# Patient Record
Sex: FEMALE | Race: BLACK OR AFRICAN AMERICAN | Employment: OTHER | URBAN - METROPOLITAN AREA
[De-identification: names, ages, dates, MRNs, and addresses within clinical notes are randomized per-mention and may not be internally consistent; named-entity substitution may affect disease eponyms.]

---

## 2019-03-13 ENCOUNTER — OFFICE VISIT (OUTPATIENT)
Dept: NEUROLOGY | Age: 64
End: 2019-03-13

## 2019-03-13 VITALS
OXYGEN SATURATION: 98 % | DIASTOLIC BLOOD PRESSURE: 90 MMHG | SYSTOLIC BLOOD PRESSURE: 130 MMHG | WEIGHT: 278 LBS | HEART RATE: 84 BPM | RESPIRATION RATE: 16 BRPM | BODY MASS INDEX: 44.68 KG/M2 | HEIGHT: 66 IN

## 2019-03-13 DIAGNOSIS — R20.2 PARESTHESIA: Primary | ICD-10-CM

## 2019-03-13 PROBLEM — E66.01 OBESITY, MORBID (HCC): Status: ACTIVE | Noted: 2019-03-13

## 2019-03-13 RX ORDER — INSULIN DETEMIR 100 [IU]/ML
INJECTION, SOLUTION SUBCUTANEOUS
COMMUNITY
Start: 2019-02-28

## 2019-03-13 RX ORDER — EMPAGLIFLOZIN 25 MG/1
TABLET, FILM COATED ORAL
COMMUNITY
Start: 2019-02-01

## 2019-03-13 RX ORDER — CLOPIDOGREL BISULFATE 75 MG/1
TABLET ORAL
COMMUNITY
Start: 2019-01-30

## 2019-03-13 RX ORDER — EVOLOCUMAB 140 MG/ML
INJECTION, SOLUTION SUBCUTANEOUS
COMMUNITY
Start: 2019-02-20

## 2019-03-13 RX ORDER — HYDROXYCHLOROQUINE SULFATE 200 MG/1
TABLET, FILM COATED ORAL
COMMUNITY
Start: 2019-02-01

## 2019-03-13 RX ORDER — ROSUVASTATIN CALCIUM 40 MG/1
TABLET, COATED ORAL
COMMUNITY
Start: 2019-03-06

## 2019-03-13 RX ORDER — QUINAPRIL 40 MG/1
TABLET ORAL
COMMUNITY
Start: 2019-01-30

## 2019-03-13 RX ORDER — EZETIMIBE 10 MG/1
TABLET ORAL
COMMUNITY
Start: 2019-02-01

## 2019-03-13 RX ORDER — GABAPENTIN 300 MG/1
CAPSULE ORAL
COMMUNITY
Start: 2019-02-28

## 2019-03-13 RX ORDER — DOXAZOSIN 2 MG/1
TABLET ORAL
COMMUNITY
Start: 2019-02-01

## 2019-03-13 RX ORDER — DULOXETIN HYDROCHLORIDE 30 MG/1
CAPSULE, DELAYED RELEASE ORAL
COMMUNITY
Start: 2019-02-01

## 2019-03-13 RX ORDER — AMLODIPINE BESYLATE 10 MG/1
TABLET ORAL DAILY
COMMUNITY

## 2019-03-13 RX ORDER — CARVEDILOL 6.25 MG/1
TABLET ORAL
COMMUNITY
Start: 2019-02-01

## 2019-03-13 RX ORDER — HYDROCODONE BITARTRATE AND ACETAMINOPHEN 5; 325 MG/1; MG/1
TABLET ORAL
COMMUNITY
Start: 2019-02-03

## 2019-03-13 RX ORDER — LATANOPROST 50 UG/ML
SOLUTION/ DROPS OPHTHALMIC
COMMUNITY
Start: 2019-02-04

## 2019-03-13 NOTE — PROGRESS NOTES
NEUROLOGY NEW PATIENT OFFICE CONSULTATION      3/13/2019    RE: Dennis Brown         1955      REFERRED BY:  Esperanza Heck MD        CHIEF COMPLAINT:  This is Dennis Brown is a 66 y.o. female right handed on disability for the Lupus who had concerns including New Patient. HPI:     2 months ago, patient noted numbness of all fingers of both hands, left worse than right, comes and goes, wakes patient up at night, relieved with shaking hands     (+) L wrist pain   (+) weakness of   (+) neck pain radiating to shoulders  (+) numbness of both feet for 1 yr    ROS   (-) fever  (-) rash  All other systems reviewed and are negative    Past Medical Hx  Past Medical History:   Diagnosis Date    Arthritis     Diabetes ([facility] [address]     Headache     Hypertension     Stroke ([facility] [address]    Lupus since 2002 - seeing Dr Rosanna Crespo (rheumatologist)  Fibromyalgia  Stroke 2002 with residual weakness of the R arm and R leg    Social Hx  Social History     Socioeconomic History    Marital status: UNKNOWN     Spouse name: Not on file    Number of children: Not on file    Years of education: Not on file    Highest education level: Not on file   Tobacco Use    Smoking status: Never Smoker    Smokeless tobacco: Never Used   Substance and Sexual Activity    Alcohol use: No     Frequency: Never       Family Hx  No family history on file.     ALLERGIES  Allergies   Allergen Reactions    Metformin Diarrhea    Zocor [Simvastatin] Rash       CURRENT MEDS  Current Outpatient Medications   Medication Sig Dispense Refill    JARDIANCE 25 mg tablet       DULoxetine (CYMBALTA) 30 mg capsule       clopidogrel (PLAVIX) 75 mg tab       HYDROcodone-acetaminophen (NORCO) 5-325 mg per tablet       gabapentin (NEURONTIN) 300 mg capsule       latanoprost (XALATAN) 0.005 % ophthalmic solution       rosuvastatin (CRESTOR) 40 mg tablet       LEVEMIR FLEXTOUCH U-100 INSULN 100 unit/mL (3 mL) inpn       REPATHA SURECLICK pen injection       ezetimibe (ZETIA) 10 mg tablet       quinapril (ACCUPRIL) 40 mg tablet       carvedilol (COREG) 6.25 mg tablet       doxazosin (CARDURA) 2 mg tablet       amLODIPine (NORVASC) 10 mg tablet Take  by mouth daily.  insulin lispro (HUMALOG KWIKPEN INSULIN) 200 unit/mL (3 mL) inpn by SubCUTAneous route.  hydroxychloroquine (PLAQUENIL) 200 mg tablet              PREVIOUS WORKUP: (reviewed)  IMAGING:    CT Results (recent):  No results found for this or any previous visit. MRI Results (recent):  No results found for this or any previous visit. IR Results (recent):  No results found for this or any previous visit. VAS/US Results (recent):  No results found for this or any previous visit. LABS (reviewed)  No results found for this or any previous visit. Physical Exam:     Visit Vitals  /90 (BP 1 Location: Right arm, BP Patient Position: Sitting)   Pulse 84   Resp 16   Ht 5' 6\" (1.676 m)   Wt 126.1 kg (278 lb)   SpO2 98%   BMI 44.87 kg/m²     General:  Alert, cooperative, no distress. Obese   Head:  Normocephalic, without obvious abnormality, atraumatic. Eyes:  Conjunctivae/corneas clear. Lungs:  Heart:   Non labored breathing  Regular rate and rhythm, no carotid bruits   Abdomen:   Soft, non-distended   Extremities: Extremities normal, atraumatic, no cyanosis or edema. Pulses: 2+ and symmetric all extremities. Skin: Skin color, texture, turgor normal. No rashes or lesions.   Neurologic Exam     Gen: Attention normal             Language: naming, repetition, fluency normal             Memory: intact recent and remote memory  Cranial Nerves:  I: smell Not tested   II: visual fields Full to confrontation   II: pupils Equal, round, reactive to light   II: optic disc No papilledema   III,VII: ptosis none   III,IV,VI: extraocular muscles  Full ROM   V: mastication normal   V: facial light touch sensation  normal   VII: facial muscle function   Minimal R facial asymmetry   VIII: hearing symmetric   IX: soft palate elevation  normal   XI: trapezius strength  5/5   XI: sternocleidomastoid strength 5/5   XI: neck flexion strength  5/5   XII: tongue  midline     Motor: (+) R pronator drift  (+) slow R foot tapping  Sensory:  dec PP R arm and R leg  and L 5th finger  Reflexes: 1+ L UE 2+ R UE, + knees and ankles throughout; Equivocal toes  Coordination: Good FTN and HTS  Gait: mild spastic R gait           Impression:     Pipo Fortune is a 66 y.o. female who  has a past medical history of Arthritis, Diabetes ([address] Headache, Hypertension, and Stroke ([facility] [address] Lupus, fibromyalgia seeing Dr Jackson Comer (rheumatology) who 2 months ago, noted numbness of all fingers of both hands, left worse than right, comes and goes, wakes patient up at night, relieved with shaking hands associated with L wrist pain and weakness of . Considerations include carpal tunnel syndrome, cervical radiculopathy ( neck pain radiating to shoulders) and part of generalized polyneuropathy due to diabetes ( numbness of both feet for 1 yr). On exam, patient also has mild R hemiplegia and hemisensory loss more likely residual from previous stroke event in 2002. RECOMMENDATIONS  1. I had a long discussion with patient. Discussed diagnosis, prognosis, pathophysiology and available treatment. All questions were answered. 2. Will do EMG/NCS of both UE with CTS protocol  3. Advise to optimize medical management of DM  4. Already seeing Dr Jackson Comer fo rher lupus and fibromyalgia  5. Depending on above, will consider surgical referral, hand exercises and/or wrist splint      Follow-up Disposition:  Return for above testing.       Thank you for the consultation      Marlee Chandra MD  Diplomate, American Board of Psychiatry and Neurology  Diplomate, Neuromuscular Medicine  Diplomate, American Board of Electrodiagnostic Medicine        CC: Se Aburto MD  Fax: [phone]

## 2019-03-13 NOTE — LETTER
3/13/2019 1:41 PM 
 
Patient:  Anat Dawn YOB: 1955 Date of Visit: 3/13/2019 Dear Channing Candelaria MD 
[address] [place] [address] VIA Facsimile: [phone] Naren Leo MD 
[facility] 
[address] [facility] [address] VIA Facsimile: [phone] 
 : Thank you for referring Ms. Anat Dawn to me for evaluation/treatment. Below are the relevant portions of my assessment and plan of care. If you have questions, please do not hesitate to call me. I look forward to following Ms. Pedersen along with you. Sincerely, Dorothy Oquendo MD

## 2019-03-28 ENCOUNTER — OFFICE VISIT (OUTPATIENT)
Dept: NEUROLOGY | Age: 64
End: 2019-03-28

## 2019-03-28 DIAGNOSIS — G56.03 BILATERAL CARPAL TUNNEL SYNDROME: Primary | ICD-10-CM

## 2019-03-28 DIAGNOSIS — R20.2 PARESTHESIA: ICD-10-CM

## 2019-03-28 NOTE — PATIENT INSTRUCTIONS
Carpal Tunnel Syndrome: Exercises  Your Care Instructions  Here are some examples of typical rehabilitation exercises for your condition. Start each exercise slowly. Ease off the exercise if you start to have pain. Your doctor or your physical or occupational therapist will tell you when you can start these exercises and which ones will work best for you. Warm-up stretches  When you no longer have pain or numbness, you can do exercises to help prevent carpal tunnel syndrome from coming back. Do not do any stretch or movement that is uncomfortable or painful. 1. Rotate your wrist up, down, and from side to side. Repeat 4 times. 2. Stretch your fingers far apart. Relax them, and then stretch them again. Repeat 4 times. 3. Stretch your thumb by pulling it back gently, holding it, and then releasing it. Repeat 4 times. How to do the exercises  Prayer stretch    1. Start with your palms together in front of your chest just below your chin. 2. Slowly lower your hands toward your waistline, keeping your hands close to your stomach and your palms together until you feel a mild to moderate stretch under your forearms. 3. Hold for at least 15 to 30 seconds. Repeat 2 to 4 times. Wrist flexor stretch    1. Extend your arm in front of you with your palm up. 2. Bend your wrist, pointing your hand toward the floor. 3. With your other hand, gently bend your wrist farther until you feel a mild to moderate stretch in your forearm. 4. Hold for at least 15 to 30 seconds. Repeat 2 to 4 times. Wrist extensor stretch    1. Repeat steps 1 through 4 of the stretch above, but begin with your extended hand palm down. Follow-up care is a key part of your treatment and safety. Be sure to make and go to all appointments, and call your doctor if you are having problems. It's also a good idea to know your test results and keep a list of the medicines you take. Where can you learn more?   Go to http://jacinto-jaz.info/. Enter E768 in the search box to learn more about \"Carpal Tunnel Syndrome: Exercises. \"  Current as of: September 20, 2018  Content Version: 11.9  © 1716-1252 [employer]. Care instructions adapted under license by [employer] (which disclaims liability or warranty for this information). If you have questions about a medical condition or this instruction, always ask your healthcare professional. [place] [address] any warranty or liability for your use of this information.

## 2019-03-28 NOTE — PROCEDURES
EMG/ NCS Report  DRUG REHABILITATION  - [facility]  [facility]  [facility], [address] [place], [address]   Ph: [phone]   FAX: [phone]/ [phone]  Test Date:  3/28/2019    Patient: Santos Scott : 1955 Physician: Yasemin Schumacher MD   Sex: Male Height: ' \" Ref Phys: Estella Carcamo MD   ID#: [id number] Weight:  lbs. Technician: Stew Jin     Patient History / Exam:  Patient is coming for neuropathy evaluation. Santos Scott is a 66 y.o. female who  has a past medical history of Arthritis, Diabetes ([facility] [address] Headache, Hypertension, and Stroke ([facility] [address] Lupus, fibromyalgia seeing Dr Murray Langley (rheumatology) who 2 months ago, noted numbness of all fingers of both hands, left worse than right, comes and goes, wakes patient up at night, relieved with shaking hands associated with L wrist pain and weakness of . Considerations include carpal tunnel syndrome, cervical radiculopathy ( neck pain radiating to shoulders) and part of generalized polyneuropathy due to diabetes ( numbness of both feet for 1 yr). On exam, patient also has mild R hemiplegia and hemisensory loss more likely residual from previous stroke event in . Exam: Patient awake, alert, follows commands, clear speech; hearing grossly intact; EOMI, (-) facial asymmetry, tongue midline; Motor: (+) R pronator drift  (+) slow R foot tapping  Sensory:  dec PP R arm and R leg  and L 5th finger  Reflexes: 1+ L UE 2+ R UE, + knees and ankles throughout; Equivocal toes  Coordination: Good FTN and HTS  Gait: mild spastic R gait    EMG & NCV Findings:  Evaluation of the left median motor and the right median motor nerves showed normal distal onset latency (L4.5, R4.5 ms), normal amplitude (L7.7, R8.6 mV), and normal conduction velocity (Elbow-Wrist, L50, R52 m/s).   The left ulnar motor and the right ulnar motor nerves showed normal distal onset latency (L3.0, R3.0 ms), normal amplitude (L7.8, R7.1 mV), normal conduction velocity (B Elbow-Wrist, L57, R51 m/s), and normal conduction velocity (A Elbow-B Elbow, L59, R50 m/s). The left median sensory and the right median sensory nerves showed prolonged distal peak latency (L5.1, R5.3 ms) and normal amplitude (L45.2, R27.7 µV). The left radial sensory, the right radial sensory, the left ulnar sensory, and the right ulnar sensory nerves showed normal distal peak latency (L2.1, R2.0, L3.5, R3.3 ms) and normal amplitude (L56.7, R47.1, L26.5, R21.5 µV). All left vs. right side differences were within normal limits. All F Wave latencies were within normal limits. All F Wave left vs. right side latency differences were within normal limits. All examined muscles (as indicated in the following table) showed no evidence of electrical instability. Impression:  ABNORMAL    Extensive electrodiagnostic examination of the right and left upper extremities shows evidence of bilateral median neuropathies at or distal to the wrist, consistent with a clinical diagnosis of carpal tunnel syndrome, both mild in degree electrically. There is no evidence of a cervical motor radiculopathy.         Juan A Pulliam MD  Diplomate, American Board of Psychiatry and Neurology  Diplomate, Neuromuscular Medicine  Diplomate, American Board of Electrodiagnostic Medicine  Director, [address] [place] [address] Accredited Laboratory with Exemplary Status      Nerve Conduction Studies  Anti Sensory Summary Table     Stim Site NR Peak (ms) Norm Peak (ms) P-T Amp (µV) Norm P-T Amp Site1 Site2 Dist (cm)   Left Median Anti Sensory (2nd Digit)  29.9°C   Wrist    5.1 <4 45.2 >13 Wrist 2nd Digit 14.0   Right Median Anti Sensory (2nd Digit)  30.3°C   Wrist    5.3 <4 27.7 >13 Wrist 2nd Digit 14.0   Left Radial Anti Sensory (Base 1st Digit)  29.1°C   Wrist    2.1 <2.8 56.7 >11 Wrist Base 1st Digit 10.0   Right Radial Anti Sensory (Base 1st Digit)  29.6°C   Wrist    2.0 <2.8 47.1 >11 Wrist Base 1st Digit 10.0   Left Ulnar Anti Sensory (5th Digit)  30.2°C   Wrist    3.5 <4.0 26.5 >9 Wrist 5th Digit 14.0   Right Ulnar Anti Sensory (5th Digit)  29.5°C   Wrist    3.3 <4.0 21.5 >9 Wrist 5th Digit 14.0     Motor Summary Table     Stim Site NR Onset (ms) Norm Onset (ms) O-P Amp (mV) Norm O-P Amp Amp (Prev) (%) Site1 Site2 Dist (cm) Jatin (m/s) Norm Jatin (m/s)   Left Median Motor (Abd Poll Brev)  28.9°C   Wrist    4.5 <4.5 7.7 >4.1 100.0 Wrist Abd Poll Brev 8.0     Elbow    8.9  7.3  94.8 Elbow Wrist 22.0 50 >49   Right Median Motor (Abd Poll Brev)  29.6°C   Wrist    4.5 <4.5 8.6 >4.1 100.0 Wrist Abd Poll Brev 8.0     Elbow    8.8  8.2  95.3 Elbow Wrist 22.5 52 >49   Left Ulnar Motor (Abd Dig Minimi)  27.1°C   Wrist    3.0 <3.1 7.8 >7.0 100.0 Wrist Abd Dig Minimi 8.0  >50   B Elbow    6.7  7.6  97.4 B Elbow Wrist 21.0 57 >50   A Elbow    8.4  7.4  97.4 A Elbow B Elbow 10.0 59 >50   Right Ulnar Motor (Abd Dig Minimi)  28.6°C   Wrist    3.0 <3.1 7.1 >7.0 100.0 Wrist Abd Dig Minimi 8.0  >50   B Elbow    7.1  6.6  93.0 B Elbow Wrist 21.0 51 >50   A Elbow    9.1  6.3  95.5 A Elbow B Elbow 10.0 50 >50     F Wave Studies     NR F-Lat (ms) Lat Norm (ms) L-R F-Lat (ms) L-R Lat Norm   Left Ulnar (Mrkrs) (Abd Dig Min)  27.3°C      29.09 <32 0.64 <2.5   Right Ulnar (Mrkrs) (Abd Dig Min)  28.6°C      28.44 <32 0.64 <2.5     EMG     Side Muscle Nerve Root Ins Act Fibs Psw Recrt Duration Amp Poly Comment   Left 1stDorInt Ulnar C8-T1 Nml Nml Nml Nml Nml Nml Nml    Left ExtIndicis Radial (Post Int) C7-8 Nml Nml Nml Nml Nml Nml Nml    Left Abd Poll Brev Median C8-T1 Nml Nml Nml Nml Nml Nml Nml    Left Biceps Musculocut C5-6 Nml Nml Nml Nml Nml Nml Nml    Left Triceps Radial C6-7-8 Nml Nml Nml Nml Nml Nml Nml    Left Deltoid Axillary C5-6 Nml Nml Nml Nml Nml Nml Nml    Right 1stDorInt Ulnar C8-T1 Nml Nml Nml Nml Nml Nml Nml    Right ExtIndicis Radial (Post Int) C7-8 Nml Nml Nml Nml Nml Nml Nml    Right Abd Poll Brev Median C8-T1 Nml Nml Nml Nml Nml Nml Nml    Right Biceps Musculocut C5-6 Nml Nml Nml Nml Nml Nml Nml    Right Triceps Radial C6-7-8 Nml Nml Nml Nml Nml Nml Nml    Right Deltoid Axillary C5-6 Nml Nml Nml Nml Nml Nml Nml    Right Lower Cerv Parasp Rami C7,T1 Nml Nml Nml Nml Nml Nml Nml                Nerve Conduction Studies  Anti Sensory Left/Right Comparison     Stim Site L Lat (ms) R Lat (ms) L-R Lat (ms) L Amp (µV) R Amp (µV) L-R Amp (%) Site1 Site2 L Jatin (m/s) R Jatin (m/s) L-R Jatin (m/s)   Median Anti Sensory (2nd Digit)  29.9°C   Wrist 4.0 4.2 0.2 45.2 27.7 38.7 Wrist 2nd Digit 35 33 2   Radial Anti Sensory (Base 1st Digit)  29.1°C   Wrist 1.4 1.6 0.2 56.7 47.1 16.9 Wrist Base 1st Digit 71 63 8   Ulnar Anti Sensory (5th Digit)  30.2°C   Wrist 2.7 2.5 0.2 26.5 21.5 18.9 Wrist 5th Digit 52 56 4     Motor Left/Right Comparison     Stim Site L Lat (ms) R Lat (ms) L-R Lat (ms) L Amp (mV) R Amp (mV) L-R Amp (%) Site1 Site2 L Jatin (m/s) R Jatin (m/s) L-R Jatin (m/s)   Median Motor (Abd Poll Brev)  28.9°C   Wrist 4.5 4.5 0.0 7.7 8.6 10.5 Wrist Abd Poll Brev      Elbow 8.9 8.8 0.1 7.3 8.2 11.0 Elbow Wrist 50 52 2   Ulnar Motor (Abd Dig Minimi)  27.1°C   Wrist 3.0 3.0 0.0 7.8 7.1 9.0 Wrist Abd Dig Minimi      B Elbow 6.7 7.1 0.4 7.6 6.6 13.2 B Elbow Wrist 57 51 6   A Elbow 8.4 9.1 0.7 7.4 6.3 14.9 A Elbow B Elbow 59 50 9         Waveforms:

## 2019-03-28 NOTE — PROGRESS NOTES
EMG/NCS done. See Procedure Note for results. Patient has recently fallen twice. Discussed EMG/NCS of both UE shows bilateral CTS, both mild in degree electrically    A> bilateral CTS, both mild in degree electrically   Gait disturbance with falls, may be related to R spastic gait due to history of stroke    P> 1) Bilateral wrist splint to wear every night  2) Given wrist exercises  3) Advise observing gait for now. Recommend physical therapy for gait and balance training if patient continues to experience falls.     FU as needed    Anita Galo MD

## 2019-03-28 NOTE — LETTER
3/28/2019 11:40 AM 
 
Patient:  Laina Doyle YOB: 1955 Date of Visit: 3/28/2019 Dear Brenda Sepulveda MD 
[address] [place] [address] VIA Facsimile: [phone] 
 : Thank you for referring Ms. Laina Doyle to me for EMG/NCS. EMG/ NCS Report [id number] [facility] [address], [address] [place] [address] Ph: [phone] FAX: [phone] Test Date:  3/28/2019 Patient: Laina Doyle : 1955 Physician: David Mcneil MD  
Sex: Male Height: ' \" Ref Phys: Josh Chong MD  
ID#: [id number] Weight:  lbs. Technician: Kat Larsen Patient History / Exam: 
Patient is coming for neuropathy evaluation. Laina Doyle is a 66 y.o. female who  has a past medical history of Arthritis, Diabetes ([address] Headache, Hypertension, and Stroke ([address] Lupus, fibromyalgia seeing Dr Wilner Rosario (rheumatology) who 2 months ago, noted numbness of all fingers of both hands, left worse than right, comes and goes, wakes patient up at night, relieved with shaking hands associated with L wrist pain and weakness of . Considerations include carpal tunnel syndrome, cervical radiculopathy ( neck pain radiating to shoulders) and part of generalized polyneuropathy due to diabetes ( numbness of both feet for 1 yr). On exam, patient also has mild R hemiplegia and hemisensory loss more likely residual from previous stroke event in . Exam: Patient awake, alert, follows commands, clear speech; hearing grossly intact; EOMI, (-) facial asymmetry, tongue midline; Motor: (+) R pronator drift 
(+) slow R foot tapping Sensory:  dec PP R arm and R leg  and L 5th finger Reflexes: 1+ L UE 2+ R UE, + knees and ankles throughout; Equivocal toes Coordination: Good FTN and HTS Gait: mild spastic R gait EMG & NCV Findings: 
Evaluation of the left median motor and the right median motor nerves showed normal distal onset latency (L4.5, R4.5 ms), normal amplitude (L7.7, R8.6 mV), and normal conduction velocity (Elbow-Wrist, L50, R52 m/s). The left ulnar motor and the right ulnar motor nerves showed normal distal onset latency (L3.0, R3.0 ms), normal amplitude (L7.8, R7.1 mV), normal conduction velocity (B Elbow-Wrist, L57, R51 m/s), and normal conduction velocity (A Elbow-B Elbow, L59, R50 m/s). The left median sensory and the right median sensory nerves showed prolonged distal peak latency (L5.1, R5.3 ms) and normal amplitude (L45.2, R27.7 µV). The left radial sensory, the right radial sensory, the left ulnar sensory, and the right ulnar sensory nerves showed normal distal peak latency (L2.1, R2.0, L3.5, R3.3 ms) and normal amplitude (L56.7, R47.1, L26.5, R21.5 µV). All left vs. right side differences were within normal limits. All F Wave latencies were within normal limits. All F Wave left vs. right side latency differences were within normal limits. All examined muscles (as indicated in the following table) showed no evidence of electrical instability. Impression: ABNORMAL Extensive electrodiagnostic examination of the right and left upper extremities shows evidence of bilateral median neuropathies at or distal to the wrist, consistent with a clinical diagnosis of carpal tunnel syndrome, both mild in degree electrically. There is no evidence of a cervical motor radiculopathy. Rody Freire MD 
Diplomate, American Board of Psychiatry and Neurology Diplomate, Neuromuscular Medicine Diplomate, [address] [place] [address] Board of Electrodiagnostic Medicine Director, [address] [place] [address] Accredited Laboratory with Exemplary Status Nerve Conduction Studies Anti Sensory Summary Table Stim Site NR Peak (ms) Norm Peak (ms) P-T Amp (µV) Norm P-T Amp Site1 Site2 Dist (cm) Left Median Anti Sensory (2nd Digit)  29.9°C Wrist    5.1 <4 45.2 >13 Wrist 2nd Digit 14.0 Right Median Anti Sensory (2nd Digit)  30.3°C Wrist    5.3 <4 27.7 >13 Wrist 2nd Digit 14.0 Left Radial Anti Sensory (Base 1st Digit)  29.1°C Wrist    2.1 <2.8 56.7 >11 Wrist Base 1st Digit 10.0 Right Radial Anti Sensory (Base 1st Digit)  29.6°C Wrist    2.0 <2.8 47.1 >11 Wrist Base 1st Digit 10.0 Left Ulnar Anti Sensory (5th Digit)  30.2°C Wrist    3.5 <4.0 26.5 >9 Wrist 5th Digit 14.0 Right Ulnar Anti Sensory (5th Digit)  29.5°C Wrist    3.3 <4.0 21.5 >9 Wrist 5th Digit 14.0 Motor Summary Table Stim Site NR Onset (ms) Norm Onset (ms) O-P Amp (mV) Norm O-P Amp Amp (Prev) (%) Site1 Site2 Dist (cm) Jatin (m/s) Norm Jatin (m/s) Left Median Motor (Abd Poll Brev)  28.9°C Wrist    4.5 <4.5 7.7 >4.1 100.0 Wrist Abd Poll Brev 8.0 Elbow    8.9  7.3  94.8 Elbow Wrist 22.0 50 >49 Right Median Motor (Abd Poll Brev)  29.6°C Wrist    4.5 <4.5 8.6 >4.1 100.0 Wrist Abd Poll Brev 8.0 Elbow    8.8  8.2  95.3 Elbow Wrist 22.5 52 >49 Left Ulnar Motor (Abd Dig Minimi)  27.1°C Wrist    3.0 <3.1 7.8 >7.0 100.0 Wrist Abd Dig Minimi 8.0  >50 B Elbow    6.7  7.6  97.4 B Elbow Wrist 21.0 57 >50 A Elbow    8.4  7.4  97.4 A Elbow B Elbow 10.0 59 >50 Right Ulnar Motor (Abd Dig Minimi)  28.6°C Wrist    3.0 <3.1 7.1 >7.0 100.0 Wrist Abd Dig Minimi 8.0  >50 B Elbow    7.1  6.6  93.0 B Elbow Wrist 21.0 51 >50 A Elbow    9.1  6.3  95.5 A Elbow B Elbow 10.0 50 >50 F Wave Studies NR F-Lat (ms) Lat Norm (ms) L-R F-Lat (ms) L-R Lat Norm Left Ulnar (Mrkrs) (Abd Dig Min)  27.3°C  
   29.09 <32 0.64 <2.5 Right Ulnar (Mrkrs) (Abd Dig Min)  28.6°C  
   28.44 <32 0.64 <2.5 EMG Side Muscle Nerve Root Ins Act Fibs Psw Recrt Duration Amp Poly Comment Left 1stDorInt Ulnar C8-T1 Nml Nml Nml Nml Nml Nml Nml Left ExtIndicis Radial (Post Int) C7-8 Nml Nml Nml Nml Nml Nml Nml Left Abd Poll Brev Median C8-T1 Nml Nml Nml Nml Nml Nml Nml   
 Left Biceps Musculocut C5-6 Nml Nml Nml Nml Nml Nml Nml Left Triceps Radial C6-7-8 Nml Nml Nml Nml Nml Nml Nml Left Deltoid Axillary C5-6 Nml Nml Nml Nml Nml Nml Nml Right 1stDorInt Ulnar C8-T1 Nml Nml Nml Nml Nml Nml Nml Right ExtIndicis Radial (Post Int) C7-8 Nml Nml Nml Nml Nml Nml Nml Right Abd Poll Brev Median C8-T1 Nml Nml Nml Nml Nml Nml Nml Right Biceps Musculocut C5-6 Nml Nml Nml Nml Nml Nml Nml Right Triceps Radial C6-7-8 Nml Nml Nml Nml Nml Nml Nml Right Deltoid Axillary C5-6 Nml Nml Nml Nml Nml Nml Nml Right Lower Cerv Parasp Rami C7,T1 Nml Nml Nml Nml Nml Nml Nml Nerve Conduction Studies Anti Sensory Left/Right Comparison Stim Site L Lat (ms) R Lat (ms) L-R Lat (ms) L Amp (µV) R Amp (µV) L-R Amp (%) Site1 Site2 L Jatin (m/s) R Jatin (m/s) L-R Jatin (m/s) Median Anti Sensory (2nd Digit)  29.9°C Wrist 4.0 4.2 0.2 45.2 27.7 38.7 Wrist 2nd Digit 35 33 2 Radial Anti Sensory (Base 1st Digit)  29.1°C Wrist 1.4 1.6 0.2 56.7 47.1 16.9 Wrist Base 1st Digit 71 63 8 Ulnar Anti Sensory (5th Digit)  30.2°C Wrist 2.7 2.5 0.2 26.5 21.5 18.9 Wrist 5th Digit 52 56 4 Motor Left/Right Comparison Stim Site L Lat (ms) R Lat (ms) L-R Lat (ms) L Amp (mV) R Amp (mV) L-R Amp (%) Site1 Site2 L Jatin (m/s) R Jatin (m/s) L-R Jatin (m/s) Median Motor (Abd Poll Brev)  28.9°C Wrist 4.5 4.5 0.0 7.7 8.6 10.5 Wrist Abd Poll Brev     
Elbow 8.9 8.8 0.1 7.3 8.2 11.0 Elbow Wrist 50 52 2 Ulnar Motor (Abd Dig Minimi)  27.1°C Wrist 3.0 3.0 0.0 7.8 7.1 9.0 Wrist Abd Dig Minimi B Elbow 6.7 7.1 0.4 7.6 6.6 13.2 B Elbow Wrist 57 51 6 A Elbow 8.4 9.1 0.7 7.4 6.3 14.9 A Elbow B Elbow 59 50 9 Waveforms: